# Patient Record
Sex: FEMALE | URBAN - METROPOLITAN AREA
[De-identification: names, ages, dates, MRNs, and addresses within clinical notes are randomized per-mention and may not be internally consistent; named-entity substitution may affect disease eponyms.]

---

## 2019-10-02 ENCOUNTER — OFFICE VISIT (OUTPATIENT)
Dept: URBAN - METROPOLITAN AREA CLINIC 29 | Facility: CLINIC | Age: 69
End: 2019-10-02
Payer: MEDICARE

## 2019-10-02 DIAGNOSIS — H25.13 AGE-RELATED NUCLEAR CATARACT, BILATERAL: ICD-10-CM

## 2019-10-02 PROCEDURE — 92002 INTRM OPH EXAM NEW PATIENT: CPT | Performed by: OPTOMETRIST

## 2019-10-02 ASSESSMENT — INTRAOCULAR PRESSURE
OS: 15
OD: 15

## 2019-10-02 ASSESSMENT — VISUAL ACUITY
OD: 20/200
OS: 20/20

## 2020-10-28 ENCOUNTER — OFFICE VISIT (OUTPATIENT)
Dept: URBAN - METROPOLITAN AREA CLINIC 29 | Facility: CLINIC | Age: 70
End: 2020-10-28
Payer: MEDICARE

## 2020-10-28 DIAGNOSIS — H52.223 REGULAR ASTIGMATISM, BILATERAL: ICD-10-CM

## 2020-10-28 PROCEDURE — 92014 COMPRE OPH EXAM EST PT 1/>: CPT | Performed by: OPTOMETRIST

## 2020-10-28 ASSESSMENT — VISUAL ACUITY
OD: 20/100
OS: 20/20

## 2020-10-28 ASSESSMENT — INTRAOCULAR PRESSURE
OD: 15
OS: 15

## 2020-10-28 NOTE — IMPRESSION/PLAN
Impression: Age-related nuclear cataract, bilateral: H25.13 OU. Plan: Discussed diagnosis in detail with patient. No treatment is required at this time. Patient will update glasses RX first. Will continue to observe condition and or symptoms.

## 2020-11-30 ENCOUNTER — OFFICE VISIT (OUTPATIENT)
Dept: URBAN - METROPOLITAN AREA CLINIC 29 | Facility: CLINIC | Age: 70
End: 2020-11-30

## 2020-11-30 PROCEDURE — V2799 MISC VISION ITEM OR SERVICE: HCPCS | Performed by: OPTOMETRIST

## 2020-11-30 ASSESSMENT — KERATOMETRY
OD: 42.00
OS: 41.75

## 2020-11-30 ASSESSMENT — VISUAL ACUITY
OS: 20/20
OD: 20/100